# Patient Record
Sex: FEMALE | Race: WHITE | Employment: OTHER | URBAN - METROPOLITAN AREA
[De-identification: names, ages, dates, MRNs, and addresses within clinical notes are randomized per-mention and may not be internally consistent; named-entity substitution may affect disease eponyms.]

---

## 2022-01-17 ENCOUNTER — HOSPITAL ENCOUNTER (EMERGENCY)
Facility: CLINIC | Age: 62
Discharge: HOME | End: 2022-01-17
Attending: EMERGENCY MEDICINE | Admitting: EMERGENCY MEDICINE
Payer: COMMERCIAL

## 2022-01-17 ENCOUNTER — APPOINTMENT (OUTPATIENT)
Dept: MRI IMAGING | Facility: CLINIC | Age: 62
End: 2022-01-17
Attending: EMERGENCY MEDICINE
Payer: COMMERCIAL

## 2022-01-17 VITALS
OXYGEN SATURATION: 97 % | HEART RATE: 64 BPM | RESPIRATION RATE: 18 BRPM | DIASTOLIC BLOOD PRESSURE: 79 MMHG | SYSTOLIC BLOOD PRESSURE: 108 MMHG | TEMPERATURE: 97.8 F

## 2022-01-17 DIAGNOSIS — R20.2 PARESTHESIAS: ICD-10-CM

## 2022-01-17 LAB
ANION GAP SERPL CALCULATED.3IONS-SCNC: 7 MMOL/L (ref 3–14)
ATRIAL RATE - MUSE: 80 BPM
BASOPHILS # BLD AUTO: 0.1 10E3/UL (ref 0–0.2)
BASOPHILS NFR BLD AUTO: 1 %
BUN SERPL-MCNC: 14 MG/DL (ref 7–30)
CALCIUM SERPL-MCNC: 9.5 MG/DL (ref 8.5–10.1)
CHLORIDE BLD-SCNC: 107 MMOL/L (ref 94–109)
CO2 SERPL-SCNC: 24 MMOL/L (ref 20–32)
CREAT SERPL-MCNC: 0.82 MG/DL (ref 0.52–1.04)
DIASTOLIC BLOOD PRESSURE - MUSE: NORMAL MMHG
EOSINOPHIL # BLD AUTO: 0.1 10E3/UL (ref 0–0.7)
EOSINOPHIL NFR BLD AUTO: 1 %
ERYTHROCYTE [DISTWIDTH] IN BLOOD BY AUTOMATED COUNT: 13 % (ref 10–15)
GFR SERPL CREATININE-BSD FRML MDRD: 80 ML/MIN/1.73M2
GLUCOSE BLD-MCNC: 106 MG/DL (ref 70–99)
HCT VFR BLD AUTO: 42.9 % (ref 35–47)
HGB BLD-MCNC: 13.9 G/DL (ref 11.7–15.7)
HOLD SPECIMEN: NORMAL
IMM GRANULOCYTES # BLD: 0 10E3/UL
IMM GRANULOCYTES NFR BLD: 0 %
INTERPRETATION ECG - MUSE: NORMAL
LYMPHOCYTES # BLD AUTO: 2.2 10E3/UL (ref 0.8–5.3)
LYMPHOCYTES NFR BLD AUTO: 26 %
MCH RBC QN AUTO: 31.4 PG (ref 26.5–33)
MCHC RBC AUTO-ENTMCNC: 32.4 G/DL (ref 31.5–36.5)
MCV RBC AUTO: 97 FL (ref 78–100)
MONOCYTES # BLD AUTO: 0.7 10E3/UL (ref 0–1.3)
MONOCYTES NFR BLD AUTO: 8 %
NEUTROPHILS # BLD AUTO: 5.5 10E3/UL (ref 1.6–8.3)
NEUTROPHILS NFR BLD AUTO: 64 %
NRBC # BLD AUTO: 0 10E3/UL
NRBC BLD AUTO-RTO: 0 /100
P AXIS - MUSE: 69 DEGREES
PLATELET # BLD AUTO: 265 10E3/UL (ref 150–450)
POTASSIUM BLD-SCNC: 3.7 MMOL/L (ref 3.4–5.3)
PR INTERVAL - MUSE: 134 MS
QRS DURATION - MUSE: 82 MS
QT - MUSE: 380 MS
QTC - MUSE: 438 MS
R AXIS - MUSE: 16 DEGREES
RBC # BLD AUTO: 4.43 10E6/UL (ref 3.8–5.2)
SODIUM SERPL-SCNC: 138 MMOL/L (ref 133–144)
SYSTOLIC BLOOD PRESSURE - MUSE: NORMAL MMHG
T AXIS - MUSE: 35 DEGREES
TROPONIN I SERPL HS-MCNC: 5 NG/L
VENTRICULAR RATE- MUSE: 80 BPM
WBC # BLD AUTO: 8.4 10E3/UL (ref 4–11)

## 2022-01-17 PROCEDURE — 250N000011 HC RX IP 250 OP 636: Performed by: EMERGENCY MEDICINE

## 2022-01-17 PROCEDURE — 36415 COLL VENOUS BLD VENIPUNCTURE: CPT | Performed by: EMERGENCY MEDICINE

## 2022-01-17 PROCEDURE — 84484 ASSAY OF TROPONIN QUANT: CPT | Performed by: EMERGENCY MEDICINE

## 2022-01-17 PROCEDURE — 70553 MRI BRAIN STEM W/O & W/DYE: CPT

## 2022-01-17 PROCEDURE — 255N000002 HC RX 255 OP 636: Performed by: EMERGENCY MEDICINE

## 2022-01-17 PROCEDURE — A9585 GADOBUTROL INJECTION: HCPCS | Performed by: EMERGENCY MEDICINE

## 2022-01-17 PROCEDURE — 85025 COMPLETE CBC W/AUTO DIFF WBC: CPT | Performed by: EMERGENCY MEDICINE

## 2022-01-17 PROCEDURE — 93005 ELECTROCARDIOGRAM TRACING: CPT

## 2022-01-17 PROCEDURE — 80048 BASIC METABOLIC PNL TOTAL CA: CPT | Performed by: EMERGENCY MEDICINE

## 2022-01-17 PROCEDURE — 99285 EMERGENCY DEPT VISIT HI MDM: CPT | Mod: 25

## 2022-01-17 RX ORDER — DIAZEPAM 10 MG/2ML
5 INJECTION, SOLUTION INTRAMUSCULAR; INTRAVENOUS ONCE
Status: DISCONTINUED | OUTPATIENT
Start: 2022-01-17 | End: 2022-01-17 | Stop reason: HOSPADM

## 2022-01-17 RX ORDER — GADOBUTROL 604.72 MG/ML
8.5 INJECTION INTRAVENOUS ONCE
Status: COMPLETED | OUTPATIENT
Start: 2022-01-17 | End: 2022-01-17

## 2022-01-17 RX ADMIN — GADOBUTROL 8.5 ML: 604.72 INJECTION INTRAVENOUS at 15:03

## 2022-01-17 RX ADMIN — DIAZEPAM 5 MG: 5 INJECTION, SOLUTION INTRAMUSCULAR; INTRAVENOUS at 14:40

## 2022-01-17 ASSESSMENT — ENCOUNTER SYMPTOMS
NUMBNESS: 1
WEAKNESS: 0
ABDOMINAL PAIN: 1
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
## 2022-01-17 NOTE — ED PROVIDER NOTES
History   Chief Complaint:  Jaw Pain and Numbness     HPI     Chrisitna Woodruff is a 61 year old female with history of arthritis who presents with numbness in her left hand and tightness in her jaw for the past 3 days. States the numbness in her hand started suddenly 3 days ago. Mainly localized to her fingertips, but states yesterday her whole hand was numb. She has some sensation to the fingers, but states it feels very different. Denies weakness in her hand. Tightness in her jaw started yesterday and is localized to the left side of her jaw. States her face feels abnormal.     The patient notes that she has numbness in her hands intermittently due to arthritis. Normally starts when she wakes up. Also endorses some epigastric pain due to acid reflux.     Review of Systems   HENT:        + jaw tightness   Gastrointestinal: Positive for abdominal pain.   Neurological: Positive for numbness. Negative for weakness.   All other systems reviewed and are negative.    Allergies:  Doxycycline  Pcn [Penicillins]  Sulfa Drugs  MSG  Amoxicillin    Medications:  Aciphex  Ambien    Past Medical History:     Insomnia  Prediabetes  Lichen sclerosus  Esophageal reflux  Allergic rhinitis  Irritable bowel  Eczema  Plantar fasciitis, bilateral  Varicose vein of leg  Asthma    Past Surgical History:    Obstetrical care (x2)    Family History:    Father: CAD s/p triple bypass, hypertension    Social History:  The patient presents to the ED alone. Her  dropped her off.   The patient is COVID vaccinated (x3) per the [facility].     Physical Exam     Patient Vitals for the past 24 hrs:   BP Temp Pulse Resp SpO2   01/17/22 1232 -- 97.8  F (36.6  C) -- -- --   01/17/22 1230 (!) 140/90 -- 91 18 100 %       Physical Exam    HEENT:    Oropharynx is moist, without lesions or trismus  Eyes:    Conjunctiva normal     PERRL     EOMs intact  Neck:    Supple, no meningismus.     CV:     Regular rate and rhythm.      No murmurs, rubs or gallops.        No unilateral leg swelling.       2+ radial pulses bilateral.       No lower extremity edema.  PULM:    Clear to auscultation bilateral.       No respiratory distress.      Good air exchange.     No rales or wheezing.  ABD:    Soft, non-tender, non-distended.       No pulsatile masses.       No rebound, guarding or rigidity.  MSK:     No gross deformity to all four extremities.      Left upper extremity:      Negative Phalen test      Negative median nerve compression test  LYMPH:   No cervical lymphadenopathy.  NEURO:   Alert & O 3      CN II-XII intact, speech is clear with no aphasia.       Finger to nose within normal limits.  No pronator drift.       Strength is 5/5 in all 4 extremities.       Sensation is intact to light touch and pinprick although reports dysesthesias in the left hand     Normal muscular tone, no tremor.  Skin:    Warm, dry and intact.    Psych:    Mood is good and affect is appropriate.      Emergency Department Course   ECG  ECG obtained at 1248, ECG read at 1254  Normal sinus rhythm. Possible left atrial enlargement. Nonspecific ST abnormality. Abnormal ECG.    Rate 80 bpm. UT interval 134 ms. QRS duration 82 ms. QT/QTc 380/438 ms. P-R-T axes 69 16 35.     Imaging:  MR Brain w/o & w Contrast   Final Result   IMPRESSION:     1. No evidence of acute infarct, mass, hemorrhage, or herniation.   2. Mild nonspecific white matter changes likely due to chronic   microvascular ischemic disease.         DIONNE GUZMAN MD            SYSTEM ID:  [id number]        Report per radiology    Laboratory:  Labs Ordered and Resulted from Time of ED Arrival to Time of ED Departure   BASIC METABOLIC PANEL - Abnormal       Result Value    Sodium 138      Potassium 3.7      Chloride 107      Carbon Dioxide (CO2) 24      Anion Gap 7      Urea Nitrogen 14      Creatinine 0.82      Calcium 9.5      Glucose 106 (*)     GFR Estimate 80     TROPONIN I - Normal    Troponin I High Sensitivity 5     CBC WITH PLATELETS AND  DIFFERENTIAL    WBC Count 8.4      RBC Count 4.43      Hemoglobin 13.9      Hematocrit 42.9      MCV 97      MCH 31.4      MCHC 32.4      RDW 13.0      Platelet Count 265      % Neutrophils 64      % Lymphocytes 26      % Monocytes 8      % Eosinophils 1      % Basophils 1      % Immature Granulocytes 0      NRBCs per 100 WBC 0      Absolute Neutrophils 5.5      Absolute Lymphocytes 2.2      Absolute Monocytes 0.7      Absolute Eosinophils 0.1      Absolute Basophils 0.1      Absolute Immature Granulocytes 0.0      Absolute NRBCs 0.0          Emergency Department Course:  Reviewed:  I reviewed nursing notes, vitals, past medical history, Care Everywhere and [facility]    Assessments:  1354 I obtained history and examined the patient as noted above.   1540 I rechecked the patient and explained findings.     Interventions:  1440 Valium 5mg IV    Disposition:  The patient was discharged to home.     Impression & Plan     Medical Decision Makin-year-old female presented to the ED with a 3-day history of left hand numbness and now more recently left jaw paresthesias.  Her neurological examination was unremarkable.  She did not have findings to suggest median nerve compression.  Basic laboratory studies were unremarkable including no electrolyte disturbance or hypoglycemia to account of her symptoms.  MRI of the brain undertaken which has ruled out acute intracranial patholog.  Paresthesias likely related to peripheral process.  No sinister pathology noted.  Patient to follow-up primary care physician for further work-up if symptoms persist.    Diagnosis:    ICD-10-CM    1. Paresthesias  R20.2        Scribe Disclosure:  I, Renae Mariano, am serving as a scribe at 1:54 PM on 2022 to document services personally performed by Marcellus Shelley MD based on my observations and the provider's statements to me.      Marcellus Shelley MD  [phone]